# Patient Record
Sex: FEMALE | Race: WHITE | URBAN - NONMETROPOLITAN AREA
[De-identification: names, ages, dates, MRNs, and addresses within clinical notes are randomized per-mention and may not be internally consistent; named-entity substitution may affect disease eponyms.]

---

## 2020-07-20 ENCOUNTER — OFFICE VISIT (OUTPATIENT)
Dept: FAMILY MEDICINE CLINIC | Age: 25
End: 2020-07-20
Payer: COMMERCIAL

## 2020-07-20 VITALS
BODY MASS INDEX: 30.22 KG/M2 | DIASTOLIC BLOOD PRESSURE: 80 MMHG | OXYGEN SATURATION: 97 % | WEIGHT: 188 LBS | HEIGHT: 66 IN | RESPIRATION RATE: 18 BRPM | SYSTOLIC BLOOD PRESSURE: 122 MMHG | TEMPERATURE: 98.4 F | HEART RATE: 82 BPM

## 2020-07-20 PROCEDURE — 99213 OFFICE O/P EST LOW 20 MIN: CPT | Performed by: PHYSICIAN ASSISTANT

## 2020-07-20 RX ORDER — METHYLPREDNISOLONE 4 MG/1
TABLET ORAL
Qty: 1 KIT | Refills: 0 | Status: SHIPPED | OUTPATIENT
Start: 2020-07-20

## 2020-07-20 NOTE — PROGRESS NOTES
2020   [address]  [address]  [facility] [address]  [address]  : 1995  Age: 23 y.o. Sex: female      Subjective:  Chief Complaint   Patient presents with   Danya Sheldon 85     1 1/2 hour ago. Swelling, and feeling itchy       HPI:  The patient states that she was stung by a bee 90 minutes ago on left fifth toe. The patient has redness, swelling no warmth to the area. The patient has not taken anything at home prior to arrival.  The patient denies any throat swelling or difficulty swallowing. The patient denies any chest pain or dyspnea. The patient has no history anaphylaxis. No fever, chills, nausea, vomiting and or diarrhea. The patient presents for evaluation. ROS:  Positive and pertinent negatives as per HPI. All other systems are reviewed and negative. Current Outpatient Medications:     methylPREDNISolone (MEDROL DOSEPACK) 4 MG tablet, Take by mouth., Disp: 1 kit, Rfl: 0   No Known Allergies     Objective:  Vitals:    20 1256   BP: 122/80   Pulse: 82   Resp: 18   Temp: 98.4 °F (36.9 °C)   TempSrc: Temporal   SpO2: 97%   Weight: 188 lb (85.3 kg)   Height: 5' 6\" (1.676 m)        Exam:  Const: Appears healthy and well developed. No signs of acute distress present. Vitals reviewed per triage. Head/Face: Normocephalic, atraumatic. Facies is symmetric. Eyes: PERRL. ENMT:   Nares are patent. Buccal mucosa is moist. No erythema or inflammation in the posterior pharynx. Neck: Supple and symmetric. Trachea midline. Resp: Lungs are clear bilaterally. CV: S1 is normal. S2 is normal.  Musculo: Patient moves extremities without pain or limitation. Skin:Skin is warm and dry. No rashes are noted. The patient has an area of sting noted left 5th phalanx with erythema soft tissue swelling no warmth. No evidence of cellulitis. It is non tender to palpation. Neuro: Alert and oriented x3. Speech is articulate and fluent.   Psych: Patient's mood and affect are appropriate to situation. Jaimee Marinelli was seen today for insect bite. Diagnoses and all orders for this visit:    Bee sting, accidental or unintentional, initial encounter    Other orders  -     methylPREDNISolone (MEDROL DOSEPACK) 4 MG tablet; Take by mouth. Benadryl as needed itching rest ice elevation    Return for Follow up with PCP in 5 days for re-evaluation. .    Seen By:    CADEN Oreilly